# Patient Record
(demographics unavailable — no encounter records)

---

## 2017-11-15 NOTE — EDM.PDOC
ED HPI GENERAL MEDICAL PROBLEM





- General


Chief Complaint: Headache


Stated Complaint: HEADACHE


Time Seen by Provider: 11/15/17 09:33


Source of Information: Reports: Patient





- History of Present Illness


INITIAL COMMENTS - FREE TEXT/NARRATIVE: 





HISTORY AND PHYSICAL:


History of present illness:


Patient presents with headache





Patient has had chronic headaches she currently rates 8 out of 10 right 

unilateral associated with light sensitivity, she has had headaches most of the 

time for the last 2 years, she was seen once in [place] for headache 

treatment generally she is able to get by with ibuprofen however she does miss 

a lot of school per her mother due to these headaches.





No fever nausea vomiting chills sweats no chest pain shortness of breath 

dizziness or palpitation no bowel or urine symptoms





Treatment below alleviated headache








Review of systems: 


As per history of present illness and below otherwise all systems reviewed and 

negative.


Past medical history: 


As per history of present illness and as reviewed below otherwise 

noncontributory.


Surgical history: 


As per history of present illness and as reviewed below otherwise 

noncontributory.


Social history: 


No reported history of drug or alcohol abuse.


Family history: 


As per history of present illness and as reviewed below otherwise 

noncontributory.








Physical exam:


HEENT: Atraumatic, normocephalic, pupils reactive, negative for conjunctival 

pallor or scleral icterus, mucous membranes moist, throat clear, neck supple, 

nontender, trachea midline.


Lungs: Clear to auscultation, breath sounds equal bilaterally, chest nontender.


Heart: S1S2, regular, negative for clicks, rubs, or JVD.


Abdomen: Soft, nondistended, nontender. Negative for masses or 

hepatosplenomegaly. Negative for costovertebral tenderness.


Pelvis: Stable nontender.


Genitourinary: Deferred.


Rectal: Deferred.


Extremities: Atraumatic, negative for cords or calf pain. Neurovascular 

unremarkable.


Neuro: Awake, alert, oriented. Cranial nerves II through XII unremarkable. 

Cerebellum unremarkable. Motor and sensory unremarkable throughout. Exam 

nonfocal.








Diagnostics:


[]Lab as directed


CT head no contrast reviewed with Dr. Guillermo no acute findings no mass etc. 

negative CT








Therapeutics:


[]1 L normal saline bolus


Zofran 8 mg IV


CT head no contrast





Topamax 25 mg by mouth daily at bedtime #7 no refill


Follow-up with pediatrician in one week for continued management


Ibuprofen 400 mg by mouth 3 times a day when necessary








Impression: 


[]Chronic headaches/migraine


Definitive disposition and diagnosis as appropriate pending reevaluation and 

review of above.


  ** Headache


Pain Score (Numeric/FACES): 7





- Related Data


 Allergies











Allergy/AdvReac Type Severity Reaction Status Date / Time


 


No Known Allergies Allergy   Verified 11/15/17 09:28











Home Meds: 


 Home Meds





. [No Known Home Meds]  11/15/17 [History]











Past Medical History





- Past Health History


Medical/Surgical History: Denies Medical/Surgical History





Social & Family History





- Tobacco Use


Smoking Status *Q: Never Smoker


Second Hand Smoke Exposure: No





- Recreational Drug Use


Recreational Drug Use: No





ED ROS GENERAL





- Review of Systems


Review Of Systems: ROS reveals no pertinent complaints other than HPI.





ED EXAM, GENERAL





- Physical Exam


Exam: See Below





Course





- Vital Signs


Last Recorded V/S: 


 Last Vital Signs











Temp  36.2 C   11/15/17 09:37


 


Pulse  101 H  11/15/17 09:37


 


Resp  18 H  11/15/17 09:37


 


BP  121/80   11/15/17 09:37


 


Pulse Ox  85 L  11/15/17 09:37














- Orders/Labs/Meds


Orders: 


 Active Orders 24 hr











 Category Date Time Status


 


 Head wo Cont [CT] Stat Exams  11/15/17 10:17 Ordered











Labs: 


 Laboratory Tests











  11/15/17 11/15/17 11/15/17 Range/Units





  09:32 09:32 10:00 


 


WBC  7.02    (4.0-11.0)  K/uL


 


RBC  4.50    (4.30-5.90)  M/uL


 


Hgb  13.3    (12.0-16.0)  g/dL


 


Hct  39.6    (36.0-46.0)  %


 


MCV  88.0    (80.0-98.0)  fL


 


MCH  29.6    (27.0-32.0)  pg


 


MCHC  33.6    (31.0-37.0)  g/dL


 


RDW Std Deviation  42.7    (28.0-62.0)  fl


 


RDW Coeff of Jackson  13    (11.0-15.0)  %


 


Plt Count  276    (150-400)  K/uL


 


MPV  11.00    (7.40-12.00)  fL


 


Neut % (Auto)  50.9    (48.0-80.0)  %


 


Lymph % (Auto)  39.0    (16.0-40.0)  %


 


Mono % (Auto)  7.3    (0.0-15.0)  %


 


Eos % (Auto)  2.1    (0.0-7.0)  %


 


Baso % (Auto)  0.7    (0.0-1.5)  %


 


Neut # (Auto)  3.6    (1.4-5.7)  K/uL


 


Lymph # (Auto)  2.7 H    (0.6-2.4)  K/uL


 


Mono # (Auto)  0.5    (0.0-0.8)  K/uL


 


Eos # (Auto)  0.2    (0.0-0.7)  K/uL


 


Baso # (Auto)  0.1    (0.0-0.1)  K/uL


 


Nucleated RBC %  0.0    /100WBC


 


Nucleated RBCs #  0    K/uL


 


Sodium   138   (136-146)  mmol/L


 


Potassium   4.0   (3.5-5.1)  mmol/L


 


Chloride   106   ()  mmol/L


 


Carbon Dioxide   23   (21-31)  mmol/L


 


BUN   12   (6.0-23.0)  mg/dL


 


Creatinine   0.7   (0.6-1.5)  mg/dL


 


Est Cr Clr Drug Dosing   TNP   


 


Estimated GFR (MDRD)   94.4   ml/min


 


Glucose   88   ()  mg/dL


 


Calcium   10.0   (8.8-10.8)  mg/dL


 


Total Bilirubin   1.0   (0.1-1.5)  mg/dL


 


AST   13   (5-40)  IU/L


 


ALT   10   (8-54)  IU/L


 


Alkaline Phosphatase   89 L   (100-400)  


 


Total Protein   8.1 H   (6.0-8.0)  g/dL


 


Albumin   4.6   (3.8-5.4)  g/dL


 


Globulin   3.5   (2.0-3.5)  g/dL


 


Albumin/Globulin Ratio   1.3   (1.3-2.8)  


 


Urine Color     


 


Urine Appearance     


 


Urine pH     (5.0-8.0)  


 


Ur Specific Gravity     (1.001-1.035)  


 


Urine Protein     (NEGATIVE)  mg/dL


 


Urine Glucose (UA)     (NEGATIVE)  mg/dL


 


Urine Ketones     (NEGATIVE)  mg/dL


 


Urine Occult Blood     (NEGATIVE)  


 


Urine Nitrite     (NEGATIVE)  


 


Urine Bilirubin     (NEGATIVE)  


 


Urine Urobilinogen     (<2.0)  EU/dL


 


Ur Leukocyte Esterase     (NEGATIVE)  


 


Urine RBC     (0-2/HPF)  


 


Urine WBC     (0-5/HPF)  


 


Ur Epithelial Cells     (NONE-FEW)  


 


Urine Bacteria     (NEGATIVE)  


 


Urine HCG, Qual    NEGATIVE  (NEGATIVE)  














  11/15/17 Range/Units





  10:00 


 


WBC   (4.0-11.0)  K/uL


 


RBC   (4.30-5.90)  M/uL


 


Hgb   (12.0-16.0)  g/dL


 


Hct   (36.0-46.0)  %


 


MCV   (80.0-98.0)  fL


 


MCH   (27.0-32.0)  pg


 


MCHC   (31.0-37.0)  g/dL


 


RDW Std Deviation   (28.0-62.0)  fl


 


RDW Coeff of Jackson   (11.0-15.0)  %


 


Plt Count   (150-400)  K/uL


 


MPV   (7.40-12.00)  fL


 


Neut % (Auto)   (48.0-80.0)  %


 


Lymph % (Auto)   (16.0-40.0)  %


 


Mono % (Auto)   (0.0-15.0)  %


 


Eos % (Auto)   (0.0-7.0)  %


 


Baso % (Auto)   (0.0-1.5)  %


 


Neut # (Auto)   (1.4-5.7)  K/uL


 


Lymph # (Auto)   (0.6-2.4)  K/uL


 


Mono # (Auto)   (0.0-0.8)  K/uL


 


Eos # (Auto)   (0.0-0.7)  K/uL


 


Baso # (Auto)   (0.0-0.1)  K/uL


 


Nucleated RBC %   /100WBC


 


Nucleated RBCs #   K/uL


 


Sodium   (136-146)  mmol/L


 


Potassium   (3.5-5.1)  mmol/L


 


Chloride   ()  mmol/L


 


Carbon Dioxide   (21-31)  mmol/L


 


BUN   (6.0-23.0)  mg/dL


 


Creatinine   (0.6-1.5)  mg/dL


 


Est Cr Clr Drug Dosing   


 


Estimated GFR (MDRD)   ml/min


 


Glucose   ()  mg/dL


 


Calcium   (8.8-10.8)  mg/dL


 


Total Bilirubin   (0.1-1.5)  mg/dL


 


AST   (5-40)  IU/L


 


ALT   (8-54)  IU/L


 


Alkaline Phosphatase   (100-400)  


 


Total Protein   (6.0-8.0)  g/dL


 


Albumin   (3.8-5.4)  g/dL


 


Globulin   (2.0-3.5)  g/dL


 


Albumin/Globulin Ratio   (1.3-2.8)  


 


Urine Color  YELLOW  


 


Urine Appearance  CLEAR  


 


Urine pH  6.0  (5.0-8.0)  


 


Ur Specific Gravity  1.020  (1.001-1.035)  


 


Urine Protein  NEGATIVE  (NEGATIVE)  mg/dL


 


Urine Glucose (UA)  NEGATIVE  (NEGATIVE)  mg/dL


 


Urine Ketones  NEGATIVE  (NEGATIVE)  mg/dL


 


Urine Occult Blood  NEGATIVE  (NEGATIVE)  


 


Urine Nitrite  NEGATIVE  (NEGATIVE)  


 


Urine Bilirubin  NEGATIVE  (NEGATIVE)  


 


Urine Urobilinogen  0.2  (<2.0)  EU/dL


 


Ur Leukocyte Esterase  TRACE  (NEGATIVE)  


 


Urine RBC  0-1  (0-2/HPF)  


 


Urine WBC  0-1  (0-5/HPF)  


 


Ur Epithelial Cells  MODERATE  (NONE-FEW)  


 


Urine Bacteria  RARE  (NEGATIVE)  


 


Urine HCG, Qual   (NEGATIVE)  











Meds: 


Medications














Discontinued Medications














Generic Name Dose Route Start Last Admin





  Trade Name Dbq  PRN Reason Stop Dose Admin


 


Sodium Chloride  1,000 mls @ 999 mls/hr  11/15/17 09:32  11/15/17 09:54





  Normal Saline  IV  11/15/17 10:32  999 mls/hr





  STAT ONE   Administration


 


Ketorolac Tromethamine  30 mg  11/15/17 09:32  11/15/17 09:54





  Toradol  IVPUSH  11/15/17 09:33  30 mg





  ONETIME ONE   Administration


 


Ondansetron HCl  8 mg  11/15/17 09:32  11/15/17 09:54





  Zofran  IVPUSH  11/15/17 09:33  8 mg





  ONETIME ONE   Administration














Departure





- Departure


Time of Disposition: 11:29


Disposition: Home, Self-Care 01


Condition: Good


Clinical Impression: 


 Migraine








- Discharge Information


Referrals: 


Nakul Malloy MD [Primary Care Provider] - 


Forms:  ED Department Discharge


Additional Instructions: 


Medication as prescribed


Return if symptoms persist or worsen


Ibuprofen 200-400 mg by mouth 3 times daily as needed for 7-10 days


Follow-up with pediatrician in one week call number below for appointment 

scheduling





[facility]


[address] [place] [id number]


Phone: [phone]


Fax: [phone]








The following information is given to patients seen in the emergency department 

who are being discharged to home. This information is to outline your options 

for follow-up care. We provide all patients seen in our emergency department 

with a follow-up referral.





The need for follow-up, as well as the timing and circumstances, are variable 

depending upon the specifics of your emergency department visit.





If you don't have a primary care physician on staff, we will provide you with a 

referral. We always advise you to contact your personal physician following an 

emergency department visit to inform them of the circumstance of the visit and 

for follow-up with them and/or the need for any referrals to a consulting 

specialist.





The emergency department will also refer you to a specialist when appropriate. 

This referral assures that you have the opportunity for follow-up care with a 

specialist. All of these measure are taken in an effort to provide you with 

optimal care, which includes your follow-up.





Under all circumstances we always encourage you to contact your private 

physician who remains a resource for coordinating your care. When calling for 

follow-up care, please make the office aware that this follow-up is from your 

recent emergency room visit. If for any reason you are refused follow-up, 

please contact the [facility] emergency department at [phone] 

and asked to speak to the emergency department charge nurse.








- My Orders


Last 24 Hours: 


My Active Orders





11/15/17 10:17


Head wo Cont [CT] Stat 














- Assessment/Plan


Last 24 Hours: 


My Active Orders





11/15/17 10:17


Head wo Cont [CT] Stat

## 2017-11-15 NOTE — CT
EXAMINATION:  Non contrast CT head. Coronal and sagittal reformats.

 

HISTORY: Pain

 

FINDINGS:  

No evidence of intra or extra axial hemorrhage, mass,  midline shift, hydrocephalus or edema.

 

No hypoattenuation changes in the major vascular territories to suggest acute infarct.  

 

No abnormal intracranial calcifications are detected.  No evidence of substantial vascular calcificat
ions.

 

Paranasal sinuses and mastoid air cells are well aerated without substantial findings.  Orbits and gl
obes are symmetric.

 

Pituitary fossa appears unremarkable.

 

Calvarium is intact. No evidence of skull fracture. 

 

IMPRESSION: 

No acute intracranial findings.